# Patient Record
Sex: MALE | Race: WHITE | NOT HISPANIC OR LATINO | Employment: FULL TIME | URBAN - NONMETROPOLITAN AREA
[De-identification: names, ages, dates, MRNs, and addresses within clinical notes are randomized per-mention and may not be internally consistent; named-entity substitution may affect disease eponyms.]

---

## 2017-05-16 ENCOUNTER — OFFICE VISIT (OUTPATIENT)
Dept: URGENT CARE | Facility: PHYSICIAN GROUP | Age: 62
End: 2017-05-16
Payer: COMMERCIAL

## 2017-05-16 VITALS
HEART RATE: 74 BPM | OXYGEN SATURATION: 97 % | WEIGHT: 203 LBS | TEMPERATURE: 97.5 F | HEIGHT: 75 IN | BODY MASS INDEX: 25.24 KG/M2 | DIASTOLIC BLOOD PRESSURE: 68 MMHG | RESPIRATION RATE: 18 BRPM | SYSTOLIC BLOOD PRESSURE: 122 MMHG

## 2017-05-16 DIAGNOSIS — H01.001 BLEPHARITIS OF RIGHT UPPER EYELID, UNSPECIFIED TYPE: ICD-10-CM

## 2017-05-16 PROCEDURE — 99204 OFFICE O/P NEW MOD 45 MIN: CPT | Performed by: FAMILY MEDICINE

## 2017-05-16 RX ORDER — POLYMYXIN B SULFATE AND TRIMETHOPRIM 1; 10000 MG/ML; [USP'U]/ML
1 SOLUTION OPHTHALMIC EVERY 4 HOURS
Qty: 10 ML | Refills: 0 | Status: SHIPPED | OUTPATIENT
Start: 2017-05-16

## 2017-05-16 ASSESSMENT — ENCOUNTER SYMPTOMS
EYE REDNESS: 1
EYE PAIN: 0
SORE THROAT: 0
DIZZINESS: 0
FEVER: 0
PHOTOPHOBIA: 0
CHILLS: 0
BLURRED VISION: 1
NAUSEA: 0
EYE DISCHARGE: 1
MYALGIAS: 0
VOMITING: 0
SHORTNESS OF BREATH: 0

## 2017-05-16 ASSESSMENT — PAIN SCALES - GENERAL: PAINLEVEL: NO PAIN

## 2017-05-16 ASSESSMENT — VISUAL ACUITY
OS_CC: 20/15
OD_CC: 20/70

## 2017-05-16 NOTE — PROGRESS NOTES
"Subjective:      Taj Webb is a 62 y.o. male who presents with Eye Swelling            Eye Problem   The right eye is affected. This is a new problem. The current episode started yesterday. The problem occurs constantly. The problem has been rapidly worsening. Associated symptoms include blurred vision, an eye discharge, eye redness and itching. Pertinent negatives include no fever, nausea, photophobia or vomiting.       Review of Systems   Constitutional: Negative for fever and chills.   HENT: Negative for sore throat.    Eyes: Positive for blurred vision, discharge and redness. Negative for photophobia and pain.   Respiratory: Negative for shortness of breath.    Cardiovascular: Negative for chest pain.   Gastrointestinal: Negative for nausea and vomiting.   Genitourinary: Negative for hematuria.   Musculoskeletal: Negative for myalgias.   Skin: Positive for itching. Negative for rash.   Neurological: Negative for dizziness.     PMH:  has a past medical history of BORDERLINE HYPERTENSION.  MEDS:   Current outpatient prescriptions:   •  polymixin-trimethoprim (POLYTRIM) 75006-3.1 UNIT/ML-% Solution, Place 1 Drop in both eyes every 4 hours., Disp: 10 mL, Rfl: 0  ALLERGIES: No Known Allergies  SURGHX: History reviewed. No pertinent past surgical history.  SOCHX:  reports that he has never smoked. He has never used smokeless tobacco. He reports that he does not drink alcohol or use illicit drugs.  FH: family history is negative for Stroke and Heart Disease.      Objective:     /68 mmHg  Pulse 74  Temp(Src) 36.4 °C (97.5 °F)  Resp 18  Ht 1.905 m (6' 3\")  Wt 92.08 kg (203 lb)  BMI 25.37 kg/m2  SpO2 97%     Physical Exam   Constitutional: He is oriented to person, place, and time. He appears well-developed and well-nourished. No distress.   HENT:   Head: Normocephalic and atraumatic.   Eyes: EOM are normal. Pupils are equal, round, and reactive to light. Right eye exhibits discharge (with eye lid " swelling). Right eye exhibits no hordeolum. Right conjunctiva is injected. Right conjunctiva has no hemorrhage. Left conjunctiva is not injected. Left conjunctiva has no hemorrhage.   Cardiovascular: Normal rate and regular rhythm.    No murmur heard.  Pulmonary/Chest: Effort normal and breath sounds normal. No respiratory distress.   Abdominal: Soft. He exhibits no distension. There is no tenderness.   Neurological: He is alert and oriented to person, place, and time. He has normal reflexes. No sensory deficit.   Skin: Skin is warm and dry.   Psychiatric: He has a normal mood and affect.               Assessment/Plan:     1. Blepharitis of right upper eyelid, unspecified type  Differential diagnosis, natural history, supportive care, and indications for immediate follow-up discussed.   - polymixin-trimethoprim (POLYTRIM) 68451-5.1 UNIT/ML-% Solution; Place 1 Drop in both eyes every 4 hours.  Dispense: 10 mL; Refill: 0

## 2017-05-16 NOTE — MR AVS SNAPSHOT
"        Taj Webb   2017 12:25 PM   Office Visit   MRN: [id number]    Department:  [facility]   Dept Phone:  [phone]    Description:  Male : 1955   Provider:  Mike Sotelo M.D.           Reason for Visit     Eye Swelling with discharge x 1 day - visine last night      Allergies as of 2017     No Known Allergies      You were diagnosed with     Blepharitis of right upper eyelid, unspecified type   [[id number]]         Vital Signs     Blood Pressure Pulse Temperature Respirations Height Weight    122/68 mmHg 74 36.4 °C (97.5 °F) 18 1.905 m (6' 3\") 92.08 kg (203 lb)    Body Mass Index Oxygen Saturation Smoking Status             25.37 kg/m2 97% Never Smoker          Basic Information     Date Of Birth Sex Race Ethnicity Preferred Language    1955 Male White Non- English      Health Maintenance        Date Due Completion Dates    IMM DTaP/Tdap/Td Vaccine (1 - Tdap) 1975 ---    COLONOSCOPY 2006 ---    IMM ZOSTER VACCINE 2016 ---            Current Immunizations     No immunizations on file.      Below and/or attached are the medications your provider expects you to take. Review all of your home medications and newly ordered medications with your provider and/or pharmacist. Follow medication instructions as directed by your provider and/or pharmacist. Please keep your medication list with you and share with your provider. Update the information when medications are discontinued, doses are changed, or new medications (including over-the-counter products) are added; and carry medication information at all times in the event of emergency situations     Allergies:  No Known Allergies          Medications  Valid as of: May 16, 2017 -  2:53 PM    Generic Name Brand Name Tablet Size Instructions for use    Polymyxin B-Trimethoprim (Solution) POLYTRIM 98105-2.1 UNIT/ML-% Place 1 Drop in both eyes every 4 hours.        .                 Medicines prescribed today " were sent to:     [id number] - [place], [place] - [address] [facility] [address]    [address] [place] [address]    Phone: [phone] Fax: [phone]    Open 24 Hours?: No      Medication refill instructions:       If your prescription bottle indicates you have medication refills left, it is not necessary to call your provider’s office. Please contact your pharmacy and they will refill your medication.    If your prescription bottle indicates you do not have any refills left, you may request refills at any time through one of the following ways: The online [employer] system (except Urgent Care), by calling your provider’s office, or by asking your pharmacy to contact your provider’s office with a refill request. Medication refills are processed only during regular business hours and may not be available until the next business day. Your provider may request additional information or to have a follow-up visit with you prior to refilling your medication.   *Please Note: Medication refills are assigned a new Rx number when refilled electronically. Your pharmacy may indicate that no refills were authorized even though a new prescription for the same medication is available at the pharmacy. Please request the medicine by name with the pharmacy before contacting your provider for a refill.        Instructions    Blepharitis  Blepharitis is redness, soreness, and swelling (inflammation) of one or both eyelids. It may be caused by an allergic reaction or a bacterial infection. Blepharitis may also be associated with reddened, scaly skin (seborrhea) of the scalp and eyebrows. While you sleep, eye discharge may cause your eyelashes to stick together. Your eyelids may itch, burn, swell, and may lose their lashes. These will grow back. Your eyes may become sensitive. Blepharitis may recur and need repeated treatment. If this is the case, you may require further evaluation by an  eye specialist (ophthalmologist).  HOME CARE INSTRUCTIONS   · Keep your hands clean.  · Use a clean towel each time you dry your eyelids. Do not use this towel to clean other areas. Do not share a towel or makeup with anyone.  · Wash your eyelids with warm water or warm water mixed with a small amount of baby shampoo. Do this twice a day or as often as needed.  · Wash your face and eyebrows at least once a day.  · Use warm compresses 2 times a day for 10 minutes at a time, or as directed by your caregiver.  · Apply antibiotic ointment as directed by your caregiver.  · Avoid rubbing your eyes.  · Avoid wearing makeup until you get better.  · Follow up with your caregiver as directed.  SEEK IMMEDIATE MEDICAL CARE IF:   · You have pain, redness, or swelling that gets worse or spreads to other parts of your face.  · Your vision changes, or you have pain when looking at lights or moving objects.  · You have a fever.  · Your symptoms continue for longer than 2 to 4 days or become worse.  MAKE SURE YOU:   · Understand these instructions.  · Will watch your condition.  · Will get help right away if you are not doing well or get worse.     This information is not intended to replace advice given to you by your health care provider. Make sure you discuss any questions you have with your health care provider.     Document Released: 12/15/2001 Document Revised: 03/11/2013 Document Reviewed: 04/11/2016  [employer] Interactive Patient Education ©2016 [employer]            [employer] Access Code: [email or web address]  Expires: 6/15/2017  2:53 PM    [employer]visor  A secure, online tool to manage your health information     [employer] Frockadvisor® is a secure, online tool that connects you to your personalized health information from the privacy of your home -- day or night - making it very easy for you to manage your healthcare. Once the activation process is completed, you can even access your medical information using the Frock[employer]visor obi,  which is available for free in the [employer] Clara store or Google Play store.     [employer] provides the following levels of access (as shown below):   My Chart Features   [facility] Doctor [facility]  Specialists [facility]  Urgent  Care [facility]  Primary Care  Doctor   Email your healthcare team securely and privately 24/7 X X X    Manage appointments: schedule your next appointment; view details of past/upcoming appointments X      Request prescription refills. X      View recent personal medical records, including lab and immunizations X X X X   View health record, including health history, allergies, medications X X X X   Read reports about your outpatient visits, procedures, consult and ER notes X X X X   See your discharge summary, which is a recap of your hospital and/or ER visit that includes your diagnosis, lab results, and care plan. X X       How to register for Personal [employer]s:  1. Go to  [email or web address].  2. Click on the Sign Up Now box, which takes you to the New Member Sign Up page. You will need to provide the following information:  a. Enter your Personal [employer]s Access Code exactly as it appears at the top of this page. (You will not need to use this code after you’ve completed the sign-up process. If you do not sign up before the expiration date, you must request a new code.)   b. Enter your date of birth.   c. Enter your home email address.   d. Click Submit, and follow the next screen’s instructions.  3. Create a Personal MedSystems ID. This will be your Personal MedSystems login ID and cannot be changed, so think of one that is secure and easy to remember.  4. Create a Personal [employer] password. You can change your password at any time.  5. Enter your Password Reset Question and Answer. This can be used at a later time if you forget your password.   6. Enter your e-mail address. This allows you to receive e-mail notifications when new information is available in Personal [employer]s.  7. Click Sign Up. You can now view your health  information.    For assistance activating your [employer] account, call [phone]

## 2020-06-25 ENCOUNTER — HOSPITAL ENCOUNTER (OUTPATIENT)
Dept: HOSPITAL 8 - CVU | Age: 65
Discharge: HOME | End: 2020-06-25
Attending: INTERNAL MEDICINE
Payer: COMMERCIAL

## 2020-06-25 DIAGNOSIS — M79.605: ICD-10-CM

## 2020-06-25 DIAGNOSIS — R20.0: ICD-10-CM

## 2020-06-25 DIAGNOSIS — M79.604: Primary | ICD-10-CM

## 2020-06-25 PROCEDURE — 93922 UPR/L XTREMITY ART 2 LEVELS: CPT

## 2021-03-03 DIAGNOSIS — Z23 NEED FOR VACCINATION: ICD-10-CM

## 2022-10-20 ENCOUNTER — APPOINTMENT (OUTPATIENT)
Dept: URGENT CARE | Facility: PHYSICIAN GROUP | Age: 67
End: 2022-10-20
Payer: COMMERCIAL